# Patient Record
Sex: FEMALE | Race: WHITE | NOT HISPANIC OR LATINO | Employment: UNEMPLOYED | URBAN - METROPOLITAN AREA
[De-identification: names, ages, dates, MRNs, and addresses within clinical notes are randomized per-mention and may not be internally consistent; named-entity substitution may affect disease eponyms.]

---

## 2022-01-26 ENCOUNTER — TELEPHONE (OUTPATIENT)
Dept: OBSTETRICS AND GYNECOLOGY | Facility: CLINIC | Age: 52
End: 2022-01-26
Payer: COMMERCIAL

## 2022-01-26 NOTE — TELEPHONE ENCOUNTER
Patient states she's been taking Valtrex x 1 week and it does not seem to be getting better. Appt made for evaluation 01/27/2022 @ 2:45pm with Dr. Teran. Understanding was voiced.

## 2022-01-26 NOTE — TELEPHONE ENCOUNTER
----- Message from Michelle Lundberg sent at 1/26/2022  1:00 PM CST -----  Contact: pt  Type: Sooner Appt Request    Caller is requesting a sooner appointment.  Caller declined first available appointment listed below.    Name of Caller:  pt  When is the first available appointment?  Unable to pull up schedule   Symptoms:  herpes outbreak  Best Call Back Number  [phone]